# Patient Record
Sex: FEMALE | Race: WHITE
[De-identification: names, ages, dates, MRNs, and addresses within clinical notes are randomized per-mention and may not be internally consistent; named-entity substitution may affect disease eponyms.]

---

## 2017-01-12 ENCOUNTER — RECORDS - HEALTHEAST (OUTPATIENT)
Dept: ADMINISTRATIVE | Facility: OTHER | Age: 22
End: 2017-01-12

## 2017-01-22 ENCOUNTER — RECORDS - HEALTHEAST (OUTPATIENT)
Dept: ADMINISTRATIVE | Facility: OTHER | Age: 22
End: 2017-01-22

## 2017-02-09 ENCOUNTER — RECORDS - HEALTHEAST (OUTPATIENT)
Dept: ADMINISTRATIVE | Facility: OTHER | Age: 22
End: 2017-02-09

## 2017-03-23 ENCOUNTER — RECORDS - HEALTHEAST (OUTPATIENT)
Dept: ADMINISTRATIVE | Facility: OTHER | Age: 22
End: 2017-03-23

## 2017-04-16 ENCOUNTER — RECORDS - HEALTHEAST (OUTPATIENT)
Dept: ADMINISTRATIVE | Facility: OTHER | Age: 22
End: 2017-04-16

## 2017-04-17 ENCOUNTER — RECORDS - HEALTHEAST (OUTPATIENT)
Dept: ADMINISTRATIVE | Facility: OTHER | Age: 22
End: 2017-04-17

## 2017-05-04 ENCOUNTER — RECORDS - HEALTHEAST (OUTPATIENT)
Dept: ADMINISTRATIVE | Facility: OTHER | Age: 22
End: 2017-05-04

## 2017-05-04 ENCOUNTER — TRANSFERRED RECORDS (OUTPATIENT)
Dept: HEALTH INFORMATION MANAGEMENT | Facility: CLINIC | Age: 22
End: 2017-05-04

## 2017-05-09 ENCOUNTER — PRE VISIT (OUTPATIENT)
Dept: OTOLARYNGOLOGY | Facility: CLINIC | Age: 22
End: 2017-05-09

## 2017-05-09 NOTE — TELEPHONE ENCOUNTER
5/9/17 received records from [facility].    **Records team please ask patient where CT scan was done.

## 2017-05-25 ENCOUNTER — RECORDS - HEALTHEAST (OUTPATIENT)
Dept: ADMINISTRATIVE | Facility: OTHER | Age: 22
End: 2017-05-25

## 2017-06-15 NOTE — TELEPHONE ENCOUNTER
Left vmsg for pt to return call asap.    Where was imaging done/when?    May need email to send ODESSA.

## 2017-06-15 NOTE — TELEPHONE ENCOUNTER
Called [facility] and spoke to Amber, she'll get another CT Imaging disc out to me at [address] asap.

## 2017-06-21 ENCOUNTER — OFFICE VISIT (OUTPATIENT)
Dept: OTOLARYNGOLOGY | Facility: CLINIC | Age: 22
End: 2017-06-21

## 2017-06-21 VITALS — BODY MASS INDEX: 27.78 KG/M2 | WEIGHT: 177 LBS | HEIGHT: 67 IN

## 2017-06-21 DIAGNOSIS — J32.4 CHRONIC PANSINUSITIS: ICD-10-CM

## 2017-06-21 DIAGNOSIS — J32.4 CHRONIC PANSINUSITIS: Primary | ICD-10-CM

## 2017-06-21 LAB
BASOPHILS # BLD AUTO: 0 10E9/L (ref 0–0.2)
BASOPHILS NFR BLD AUTO: 0.4 %
CRP SERPL-MCNC: 3 MG/L (ref 0–8)
DIFFERENTIAL METHOD BLD: ABNORMAL
EOSINOPHIL # BLD AUTO: 0.4 10E9/L (ref 0–0.7)
EOSINOPHIL NFR BLD AUTO: 3.9 %
ERYTHROCYTE [DISTWIDTH] IN BLOOD BY AUTOMATED COUNT: 13 % (ref 10–15)
HCT VFR BLD AUTO: 47.9 % (ref 35–47)
HGB BLD-MCNC: 15.3 G/DL (ref 11.7–15.7)
IMM GRANULOCYTES # BLD: 0 10E9/L (ref 0–0.4)
IMM GRANULOCYTES NFR BLD: 0.3 %
LYMPHOCYTES # BLD AUTO: 2.5 10E9/L (ref 0.8–5.3)
LYMPHOCYTES NFR BLD AUTO: 25.7 %
MCH RBC QN AUTO: 28.3 PG (ref 26.5–33)
MCHC RBC AUTO-ENTMCNC: 31.9 G/DL (ref 31.5–36.5)
MCV RBC AUTO: 89 FL (ref 78–100)
MONOCYTES # BLD AUTO: 0.8 10E9/L (ref 0–1.3)
MONOCYTES NFR BLD AUTO: 7.6 %
NEUTROPHILS # BLD AUTO: 6.1 10E9/L (ref 1.6–8.3)
NEUTROPHILS NFR BLD AUTO: 62.1 %
NRBC # BLD AUTO: 0 10*3/UL
NRBC BLD AUTO-RTO: 0 /100
PLATELET # BLD AUTO: 253 10E9/L (ref 150–450)
RBC # BLD AUTO: 5.41 10E12/L (ref 3.8–5.2)
WBC # BLD AUTO: 9.9 10E9/L (ref 4–11)

## 2017-06-21 RX ORDER — BUDESONIDE 0.5 MG/2ML
2 INHALANT ORAL
COMMUNITY
Start: 2016-09-06

## 2017-06-21 RX ORDER — CETIRIZINE HYDROCHLORIDE 10 MG/1
10 TABLET ORAL
COMMUNITY

## 2017-06-21 RX ORDER — MEDROXYPROGESTERONE ACETATE 150 MG/ML
150 INJECTION, SUSPENSION INTRAMUSCULAR
COMMUNITY

## 2017-06-21 ASSESSMENT — PAIN SCALES - GENERAL: PAINLEVEL: NO PAIN (0)

## 2017-06-21 NOTE — PATIENT INSTRUCTIONS
Plan of care:  To schedule a sweat chloride test call [phone]  Blood work today    Clinic contact information:  1. To schedule an appointment call [phone], option 1  2. To talk to the Triage RN call [phone], option 3  3. If you need to speak to Laly or get a message to your doctor on a Friday, call the triage RN  4. LalyRN: [phone]  5. Surgery scheduling:      Cynthia Paulson: [phone]      Deisi Madrigal: [phone]  6. Fax: [phone]  7. [phone]

## 2017-06-21 NOTE — PROGRESS NOTES
Otolaryngology Adult Consultation    Patient: Hannah Otto   : 1995     Patient presents with:  Consult:   Chief Complaint   Patient presents with     Consult     chronic sinusitis        Referring Provider: Alvin Fisher   Consulting Physician:  Caroline Ayon MD       Assessment/Plan: ASSESSMENT AND PLAN:  Hannah will undergo a workup for recalcitrant chronic sinusitis.  I am going to order sweat chloride testing, cANCA, repeat her immunoglobulin testing, and have her continue with her allergy management with her baby shampoo, nasal steroid, and antihistamines.  We may bring her back and consider reimaging at some point and further addressing that maxillary antral issue with a curved microdebrider.  We also may consider looking at other possible etiologies of her facial pain.  In any event, I will let her know what her laboratory workup shows, and we will discuss results when she returns.          HPI: Hannah Otto is a 21 year old female seen today in the Otolaryngology Clinic for recalcitrant chronic rhinosinusitis.  She has had four surgeries beginning in May of 2015 through 2016.  The first was to straighten her nasal septum.  Mostly, the surgeries have been to remove cysts in her maxillary antrum.  She has had facial pain associated with this, pressure, and congestion.  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  She describes that she would have a plugged nose, anterior posterior nasal discharge, headaches, and facial pain.  She has never had problems with her ears or lungs.  She was tested for allergies.  She has seasonal allergies, and she is treated with Zyrtec.  If she does not take Zyrtec, she gets an itchy throat.  In the perioperative timeframe, she has had sinus infections treated with doxycycline and prednisone.  She has tried budesonide irrigations.  She uses baby shampoo irrigations.  She does not really feel like  anything completely helps.  When she is on antibiotics, she feels slightly better but as soon as she is off she feels worse.  She otherwise is quite healthy.  She has had an immune workup with [facility] and Dr. Gramajo.  She had low pneumococcal titers.  She was given the pneumococcal vaccine.  I do not see that they retested her titers after that.  Some of her immunoglobulin levels are on the low side but still within the normal range.  She has no family history of significant sinus disease except in her grandmother.           No current outpatient prescriptions on file prior to visit.  No current facility-administered medications on file prior to visit.        Allergies   Allergen Reactions     Sulfa Drugs        Past Medical History:   Diagnosis Date     Allergic rhinitis march 2013     Chronic sinusitis november 2014     Gastroesophageal reflux disease august 2016     Migraines november 2014     Nasal polyps may 2015         Review of Systems   ENT ROS 6/21/2017   Neurology Headache   Ears, Nose, Throat Nasal congestion or drainage   Cardiopulmonary Wheezing   Allergy/Immunology Allergies or hay fever        14 point ROS neg other than the symptoms noted above.    Physical Exam:    General Assessment   The patient is alert, oriented and in no acute distress.   Head/Face/Scalp  Grossly normal.   Ears  Normal canals, auricles and tympanic membranes.   Nose  External nose is straight, skin is normal. Intranasal exam (anterior rhinoscopy) reveals normal moist mucosa, turbinate tissue without edema, erythema or crusting.  Septum mainly in the midline.    Mouth  Oral cavity shows healthy mucosa with out ulceration, masses or other lesions involving the tongue, palate, buccal mucosa, floor of mouth or gingiva.  Dentition in good repair.  Oropharynx with normal tonsils, posterior wall and base of tongue.  Neck  No significant adenopathy, thyroid or salivary gland abnormality.       Procedure:  Nasal endoscopy  performed to examine the posterior nasal passages for pathology.    Verbal consent obtained. Topical anesthetic/decongestant solution applied per patient request.   A rigid endoscope was passed into each nasal cavity separately.  Findings are as follows:   Widely patent antrostomy and ethmoid labyrinth bilaterally.  No purulence or polyps.

## 2017-06-21 NOTE — MR AVS SNAPSHOT
After Visit Summary   2017    Hannah Otto    MRN: [id number]           Patient Information     Date Of Birth          1995        Visit Information        Provider Department      2017 3:00 PM Caroline Ayon MD [facility]        Today's Diagnoses     Chronic pansinusitis    -  1      Care Instructions    Plan of care:  To schedule a sweat chloride test call [phone]  Blood work today    Clinic contact information:  1. To schedule an appointment call [phone], option 1  2. To talk to the Triage RN call [phone], option 3  3. If you need to speak to Laly or get a message to your doctor on a Friday, call the triage RN  4. LalyRN: [phone]  5. Surgery scheduling:      Cynthia Paulson: [phone]      Deisi Garsiater: [phone]  6. Fax: [phone]  7. [phone]            Follow-ups after your visit        Future tests that were ordered for you today     Open Future Orders        Priority Expected Expires Ordered    Sweat chloride analysis Routine  2018    Antineutrophil cytoplasmic Meghan IgG Routine  2018    Strep pneumo IgG Abys 23 Serotypes Routine  2018            Who to contact     Please call your clinic at [phone] to:    Ask questions about your health    Make or cancel appointments    Discuss your medicines    Learn about your test results    Speak to your doctor   If you have compliments or concerns about an experience at your clinic, or if you wish to file a complaint, please contact [facility] Patient Relations at [phone] or email us at [email or web address]         Additional Information About Your Visit        MyChart Information     [employer] is an electronic gateway that provides easy, online access to your medical records. With [employer], you can request a clinic appointment, read your test results, renew a prescription or communicate with  "your care team.     To sign up for [employer] visit the website at [email or web address]   You will be asked to enter the access code listed below, as well as some personal information. Please follow the directions to create your username and password.     Your access code is: [email or web address]  Expires: 2017  6:31 AM     Your access code will  in 90 days. If you need help or a new code, please contact your [facility] or call [phone] for assistance.        Care EveryWhere ID     This is your Care EveryWhere ID. This could be used by other organizations to access your [place] medical records  [id number]        Your Vitals Were     Height BMI (Body Mass Index)                1.71 m (5' 7.32\") 27.46 kg/m2           Blood Pressure from Last 3 Encounters:   No data found for BP    Weight from Last 3 Encounters:   17 80.3 kg (177 lb)              We Performed the Following     CBC with platelets differential     CRP inflammation     IgA     IgE     IgM     Immunoglobulin G subclasses        Primary Care Provider    None Specified       No primary provider on file.        Equal Access to Services     [facility]: Hadii rodrigo Herman, wamillyda lujacquelineadaha, qaybta kaalmabarbara machado, bia bauman . So [facility] [phone].    ATENCIÓN: Si habla español, tiene a chino disposición servicios gratuitos de asistencia lingüística. Robbie al [phone].    We comply with applicable federal civil rights laws and [place] laws. We do not discriminate on the basis of race, color, national origin, age, disability sex, sexual orientation or gender identity.            Thank you!     Thank you for choosing [facility]  for your care. Our goal is always to provide you with excellent care. Hearing back from our patients is one way we can continue to improve our services. Please take a few minutes to complete the written survey that you may " receive in the mail after your visit with us. Thank you!             Your Updated Medication List - Protect others around you: Learn how to safely use, store and throw away your medicines at www.disposemymeds.org.          This list is accurate as of: 6/21/17  3:54 PM.  Always use your most recent med list.                   Brand Name Dispense Instructions for use Diagnosis    budesonide 0.5 MG/2ML neb solution    PULMICORT     2 mLs        cetirizine 10 MG tablet    zyrTEC     Take 10 mg by mouth        medroxyPROGESTERone 150 MG/ML injection    DEPO-PROVERA     Inject 150 mg into the muscle

## 2017-06-21 NOTE — LETTER
2017       RE: Hannah Otto  [address] [address]     Dear Colleague,    Thank you for referring your patient, Hannah Otto, to the [facility] at [facility]. Please see a copy of my visit note below.    Otolaryngology Adult Consultation    Patient: Hannah Otto   : 1995     Patient presents with:  Consult:   Chief Complaint   Patient presents with     Consult     chronic sinusitis        Referring Provider: Alvin Fisher   Consulting Physician:  Caroline Ayon MD       Assessment/Plan: ASSESSMENT AND PLAN:  Hannah will undergo a workup for recalcitrant chronic sinusitis.  I am going to order sweat chloride testing, cANCA, repeat her immunoglobulin testing, and have her continue with her allergy management with her baby shampoo, nasal steroid, and antihistamines.  We may bring her back and consider reimaging at some point and further addressing that maxillary antral issue with a curved microdebrider.  We also may consider looking at other possible etiologies of her facial pain.  In any event, I will let her know what her laboratory workup shows, and we will discuss results when she returns.          HPI: Hannah Otto is a 21 year old female seen today in the Otolaryngology Clinic for recalcitrant chronic rhinosinusitis.  She has had four surgeries beginning in May of 2015 through 2016.  The first was to straighten her nasal septum.  Mostly, the surgeries have been to remove cysts in her maxillary antrum.  She has had facial pain associated with this, pressure, and congestion.  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  She describes that she would have a plugged nose, anterior posterior nasal discharge, headaches, and facial pain.  She has never had problems with her ears or lungs.  She was tested for allergies.  She has seasonal allergies, and she is  treated with Zyrtec.  If she does not take Zyrtec, she gets an itchy throat.  In the perioperative timeframe, she has had sinus infections treated with doxycycline and prednisone.  She has tried budesonide irrigations.  She uses baby shampoo irrigations.  She does not really feel like anything completely helps.  When she is on antibiotics, she feels slightly better but as soon as she is off she feels worse.  She otherwise is quite healthy.  She has had an immune workup with [facility] and Dr. Gramajo.  She had low pneumococcal titers.  She was given the pneumococcal vaccine.  I do not see that they retested her titers after that.  Some of her immunoglobulin levels are on the low side but still within the normal range.  She has no family history of significant sinus disease except in her grandmother.           No current outpatient prescriptions on file prior to visit.  No current facility-administered medications on file prior to visit.        Allergies   Allergen Reactions     Sulfa Drugs        Past Medical History:   Diagnosis Date     Allergic rhinitis march 2013     Chronic sinusitis november 2014     Gastroesophageal reflux disease august 2016     Migraines november 2014     Nasal polyps may 2015         Review of Systems   ENT ROS 6/21/2017   Neurology Headache   Ears, Nose, Throat Nasal congestion or drainage   Cardiopulmonary Wheezing   Allergy/Immunology Allergies or hay fever        14 point ROS neg other than the symptoms noted above.    Physical Exam:    General Assessment   The patient is alert, oriented and in no acute distress.   Head/Face/Scalp  Grossly normal.   Ears  Normal canals, auricles and tympanic membranes.   Nose  External nose is straight, skin is normal. Intranasal exam (anterior rhinoscopy) reveals normal moist mucosa, turbinate tissue without edema, erythema or crusting.  Septum mainly in the midline.    Mouth  Oral cavity shows healthy mucosa with out ulceration, masses or  other lesions involving the tongue, palate, buccal mucosa, floor of mouth or gingiva.  Dentition in good repair.  Oropharynx with normal tonsils, posterior wall and base of tongue.  Neck  No significant adenopathy, thyroid or salivary gland abnormality.       Procedure:  Nasal endoscopy performed to examine the posterior nasal passages for pathology.    Verbal consent obtained. Topical anesthetic/decongestant solution applied per patient request.   A rigid endoscope was passed into each nasal cavity separately.  Findings are as follows:   Widely patent antrostomy and ethmoid labyrinth bilaterally.  No purulence or polyps.        Again, thank you for allowing me to participate in the care of your patient.      Sincerely,    Caroline Ayon MD

## 2017-06-21 NOTE — NURSING NOTE
Chief Complaint   Patient presents with     Consult     chronic sinusitis   Nathen Marshall LPN

## 2017-06-22 LAB
IGA SERPL-MCNC: 68 MG/DL (ref 70–380)
IGM SERPL-MCNC: 175 MG/DL (ref 60–265)

## 2017-06-23 LAB
ANCA IGG TITR SER IF: NORMAL {TITER}
DEPRECATED S PNEUM 1 IGG SER-MCNC: 20.3 UG/ML
DEPRECATED S PNEUM12 IGG SER-MCNC: 0.1 UG/ML
DEPRECATED S PNEUM14 IGG SER-MCNC: 3.6 UG/ML
DEPRECATED S PNEUM17 IGG SER-MCNC: 5.1 UG/ML
DEPRECATED S PNEUM19 IGG SER-MCNC: 17.3 UG/ML
DEPRECATED S PNEUM2 IGG SER-MCNC: 8.4 UG/ML
DEPRECATED S PNEUM20 IGG SER-MCNC: 0.6 UG/ML
DEPRECATED S PNEUM22 IGG SER-MCNC: 15.3 UG/ML
DEPRECATED S PNEUM23 IGG SER-MCNC: 16.7 UG/ML
DEPRECATED S PNEUM3 IGG SER-MCNC: 3.8 UG/ML
DEPRECATED S PNEUM34 IGG SER-MCNC: 2.2 UG/ML
DEPRECATED S PNEUM4 IGG SER-MCNC: 17.5 UG/ML
DEPRECATED S PNEUM43 IGG SER-MCNC: 6.7 UG/ML
DEPRECATED S PNEUM5 IGG SER-MCNC: NORMAL UG/ML
DEPRECATED S PNEUM8 IGG SER-MCNC: 2.4 UG/ML
DEPRECATED S PNEUM9 IGG SER-MCNC: 12.5 UG/ML
IGG SERPL-MCNC: 841 MG/DL (ref 695–1620)
IGG1 SER-MCNC: 463 MG/DL (ref 300–856)
IGG2 SER-MCNC: 201 MG/DL (ref 158–761)
IGG3 SER-MCNC: 91 MG/DL (ref 24–192)
IGG4 SER-MCNC: 9 MG/DL (ref 11–86)
S PNEUM DA 15B IGG SER-MCNC: 2.5 UG/ML
S PNEUM DA 18C IGG SER-MCNC: 17.6
S PNEUM DA 19A IGG SER-MCNC: 2.2 UG/ML
S PNEUM DA 33F IGG SER-MCNC: 6 UG/ML
S PNEUM DA 6B IGG SER-MCNC: 12.3 UG/ML
S PNEUM DA 7F IGG SER-MCNC: 5 UG/ML
S PNEUM DA 9V IGG SER-MCNC: 10.5 UG/ML

## 2017-06-25 LAB — IGE SERPL-ACNC: 10 KIU/L (ref 0–114)

## 2017-06-27 ENCOUNTER — RECORDS - HEALTHEAST (OUTPATIENT)
Dept: ADMINISTRATIVE | Facility: OTHER | Age: 22
End: 2017-06-27

## 2017-06-27 DIAGNOSIS — J32.4 CHRONIC PANSINUSITIS: ICD-10-CM

## 2017-06-27 LAB — SWEAT CHLORIDE: NORMAL MMOL/L CL (ref 0–30)

## 2017-06-27 PROCEDURE — 89230 COLLECT SWEAT FOR TEST: CPT | Performed by: OTOLARYNGOLOGY

## 2017-06-28 NOTE — PROGRESS NOTES
Laly, Please contact patient by letter/phone with following results: My chart note sent, please remind me to contact Dr. Colunga.

## 2017-07-10 DIAGNOSIS — R51.9 FACIAL PAIN: Primary | ICD-10-CM

## 2017-07-13 ENCOUNTER — OFFICE VISIT (OUTPATIENT)
Dept: NEUROLOGY | Facility: CLINIC | Age: 22
End: 2017-07-13

## 2017-07-13 ENCOUNTER — RECORDS - HEALTHEAST (OUTPATIENT)
Dept: ADMINISTRATIVE | Facility: OTHER | Age: 22
End: 2017-07-13

## 2017-07-13 VITALS — DIASTOLIC BLOOD PRESSURE: 106 MMHG | RESPIRATION RATE: 18 BRPM | HEART RATE: 74 BPM | SYSTOLIC BLOOD PRESSURE: 147 MMHG

## 2017-07-13 DIAGNOSIS — H57.12 RETRO-ORBITAL PAIN OF LEFT EYE: ICD-10-CM

## 2017-07-13 DIAGNOSIS — G50.1 ATYPICAL FACE PAIN: Primary | ICD-10-CM

## 2017-07-13 RX ORDER — NORTRIPTYLINE HCL 10 MG
CAPSULE ORAL
Qty: 90 CAPSULE | Refills: 1 | Status: SHIPPED | OUTPATIENT
Start: 2017-07-13

## 2017-07-13 ASSESSMENT — PAIN SCALES - GENERAL: PAINLEVEL: MODERATE PAIN (5)

## 2017-07-13 NOTE — LETTER
"7/13/2017       RE: Hannah Otto  [address] [address]     Dear Colleague,    Thank you for referring your patient, Hannah Otto, to the [facility] NEUROLOG[facility]IC at [facility]. Please see a copy of my visit note below.      DATE OF SERVICE::  07/13/2017.    Dear Dr. Ayon:      Thank you for referring Hannah Otto for neurologic consultation on 07/13/2017.  The patient is a 21-year-old woman you were kind enough to refer with a chief complaint of left facial discomfort.    The patient has had pain in the left facial area over the last 4 years.  She said it varies but involves the left maxillary area and does at times feel to be behind the left eye.  She also notes that sometimes it will have more periorbital distribution.  She said that the pain can last anywhere from 2 hours up to a whole week.  She has never noticed a ptosis with it.  There is no change in her sclerae.  She has not had any conjunctival symptoms.  She did indicate that her mother can tell when she has pain there because her face is \"puffy there.\"  She has not had any rhinitis with it that is unilateral.  Sometimes she has had this in both nostrils.  She has not had pain that radiates in the ear to the forehead or to the lower face or mandible.  I went over the inciting factors for migraine with her and really nothing that she could think of could bring it on.  She does have some intolerance of bright lights.  She is not certain alcohol would help.  The patient does not notice really that alcohol brings on the pain.  She did note that the pain can be both throbbing and constant.  She really does not notice trouble with mastication but she earlier had noticed a click in her jaw.  The patient did feel that using a cold compress would help the discomfort.  She has not had visual disturbance including obscuration of vision or diplopia.  There is nothing to " suggest focal or generalized weakness and no facial or limb paresthesias and no imbalance.  She has no obvious headache with this and has not suffered from migraine with symptoms involving her scalp.  She drinks up to 36 ounces of caffeinated beverage per day.  She sometimes will also drink tea.  She does not drink corazon, typically she drinks coffee.  She eats some foods with nitrates and she does eat chocolate about once per week.  She also eats a lot of food with tyramine.  She did note that her maternal aunt has had what she describes as cluster migraine and has had visual aura evidently.  The patient has tried Pulmicort and a Elisabeth pot, and does take Zyrtec daily for her sinus issues.  She has been using ibuprofen which does help up to 200 mg 3 times per week.  She did note that on 12/22/2016 she fell on ice and struck the back of her head.  She did not notice any residual symptoms including pain behind the eye then.  The patient never had loss of consciousness.  She had a normal and negative CT scan of the head on 12/22/2016.  Her hemoglobin was 14.2 on 04/18/2016 and she had normal chemistry profile on 12/10/2011.  She has a stable relationship with her boyfriend.  She denies any unusual stress.  She is on Depo for birth control.    ALLERGIES:  She has allergies listed to sulfa.    SOCIAL HISTORY:  The patient works as a .  She has had a total of 4 different surgeries involving her sinuses and she probably will have another surgery with you.  The patient never has used any illicit drugs.  She does not smoke and uses very little alcohol.    The patient has had recalcitrant chronic rhino sinusitis.  I do note from your records that Dr. Fisher did first straighten her nasal septum.  Mostly the surgeries have been to remove cysts in her maxillary antrum.  You noted that she had facial pain associated with this pressure and congestion.  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    NEUROLOGIC EXAMINATION:  Revealed a pleasant young woman.  Her blood pressure initially was 147/106 with a pulse 74, with machine blood pressure cuff done over the soft large cuff was 128/80 with a pulse of 74.  Gait, station, cerebellar testing, muscle stretch reflexes, plantar stimulation, strength, careful cranial nerve examination, superficial cortical sensory testing are all unremarkable.  I could not auscultate cervical or cerebral bruits and she has a regular cardiac rhythm.  The patient has a supple neck.    IMPRESSION:  Atypical facial pain versus common migraine:  The patient has somewhat unusual facial pain but could have common migraine involving the face only.  Sunlight does seem to bring on the headache or is an inciting factor.  She does have an aunt who has had migraine.  I have talked with her about trying medication and did specifically review with her different medicines that could be tried.  I did recommend nortriptyline 10 mg to be increased to 30 mg.  I did go over the potential side effects including daytime sedation and trouble driving, cardiovascular problems, dry mouth and caries as well as weight gain.  I have asked that she have formal radiologic studies to help rule out intracranial aneurysm causing pain behind the eye.  I think she should also consider having formal neuro ophthalmological examination because of her pain in the retro orbital area.  I have asked that she have follow up with me.  A complete review of medical systems was done and the positive review is listed in the report above.    I did review with the patient foods and beverages that could make headaches worse and did strongly urge that she decrease her use of caffeinated coffee to less than 6 ounces per day.    I spent 1 hour with the patient today, over 50% of the time for this involved counseling and coordination of care.    Thank you for allowing me to see this patient.    Sincerely yours, Pa  Srinivasa Bernabe MD    D:  07/20/2017 19:04 T:  07/23/2017 18:22  Document:  [id number] ND\LG    cc: Dr. Caroline Ayon  [facility]    Dr. Batres  \

## 2017-07-13 NOTE — MR AVS SNAPSHOT
After Visit Summary   7/13/2017    Hannah Otto    MRN: [id number]           Patient Information     Date Of Birth          1995        Visit Information        Provider Department      7/13/2017 12:00 PM Pa Bernabe MD [facility]        Today's Diagnoses     Atypical face pain    -  1    Retro-orbital pain of left eye           Follow-ups after your visit        Follow-up notes from your care team     Return in about 4 weeks (around 8/10/2017).      Who to contact     Please call your clinic at [phone] to:    Ask questions about your health    Make or cancel appointments    Discuss your medicines    Learn about your test results    Speak to your doctor   If you have compliments or concerns about an experience at your clinic, or if you wish to file a complaint, please contact [facility] Patient Relations at [phone] or email us at [email or web address]         Additional Information About Your Visit        MyChart Information     [employer] gives you secure access to your electronic health record. If you see a primary care provider, you can also send messages to your care team and make appointments. If you have questions, please call your primary care clinic.  If you do not have a primary care provider, please call [phone] and they will assist you.      [employer] is an electronic gateway that provides easy, online access to your medical records. With [employer], you can request a clinic appointment, read your test results, renew a prescription or communicate with your care team.     To access your existing account, please contact your [facility] or call [phone] for assistance.        Care EveryWhere ID     This is your Care EveryWhere ID. This could be used by other organizations to access your [facility] medical records  [id number]        Your Vitals Were      Pulse Respirations                74 18           Blood Pressure from Last 3 Encounters:   07/13/17 (!) 147/106    Weight from Last 3 Encounters:   06/21/17 177 lb (80.3 kg)                 Today's Medication Changes          These changes are accurate as of: 7/13/17 11:59 PM.  If you have any questions, ask your nurse or doctor.               Start taking these medicines.        Dose/Directions    nortriptyline 10 MG capsule   Commonly known as:  PAMELOR   Used for:  Atypical face pain, Retro-orbital pain of left eye   Started by:  Pa Bernabe MD        Start at 1 tab at bedtime for 3 days, then 2 tabs at bedtime for 3 days, then 3 tabs at bedtime.   Quantity:  90 capsule   Refills:  1            Where to get your medicines      These medications were sent to [place] [id number] [place] [facility] - [facility], [address], [facility] [address]     Phone:  [phone]     nortriptyline 10 MG capsule                Primary Care Provider Office Phone # Fax #    Gisel Idalia Miguel [phone]       [address] [address]        Equal Access to Services     [facility]: Hadii rodrigo lamar hadjeanetteo Soshay, waaxda luqadaha, qaybta kaalmada adeflorinayada, bia bauman . So [facility] [phone].    ATENCIÓN: Si habla español, tiene a chino disposición servicios gratuitos de asistencia lingüística. Llame al [phone].    We comply with applicable federal civil rights laws and [place] laws. We do not discriminate on the basis of race, color, national origin, age, disability sex, sexual orientation or gender identity.            Thank you!     Thank you for choosing [facility]  for your care. Our goal is always to provide you with excellent care. Hearing back from our patients is one way we can continue to improve our services. Please take a few minutes to complete the  written survey that you may receive in the mail after your visit with us. Thank you!             Your Updated Medication List - Protect others around you: Learn how to safely use, store and throw away your medicines at www.disposemymeds.org.          This list is accurate as of: 7/13/17 11:59 PM.  Always use your most recent med list.                   Brand Name Dispense Instructions for use Diagnosis    budesonide 0.5 MG/2ML neb solution    PULMICORT     2 mLs        cetirizine 10 MG tablet    zyrTEC     Take 10 mg by mouth        medroxyPROGESTERone 150 MG/ML injection    DEPO-PROVERA     Inject 150 mg into the muscle        nortriptyline 10 MG capsule    PAMELOR    90 capsule    Start at 1 tab at bedtime for 3 days, then 2 tabs at bedtime for 3 days, then 3 tabs at bedtime.    Atypical face pain, Retro-orbital pain of left eye

## 2017-07-25 NOTE — PROGRESS NOTES
"  DATE OF SERVICE::  07/13/2017.    Dear Dr. Ayon:      Thank you for referring Hannah Otto for neurologic consultation on 07/13/2017.  The patient is a 21-year-old woman you were kind enough to refer with a chief complaint of left facial discomfort.    The patient has had pain in the left facial area over the last 4 years.  She said it varies but involves the left maxillary area and does at times feel to be behind the left eye.  She also notes that sometimes it will have more periorbital distribution.  She said that the pain can last anywhere from 2 hours up to a whole week.  She has never noticed a ptosis with it.  There is no change in her sclerae.  She has not had any conjunctival symptoms.  She did indicate that her mother can tell when she has pain there because her face is \"puffy there.\"  She has not had any rhinitis with it that is unilateral.  Sometimes she has had this in both nostrils.  She has not had pain that radiates in the ear to the forehead or to the lower face or mandible.  I went over the inciting factors for migraine with her and really nothing that she could think of could bring it on.  She does have some intolerance of bright lights.  She is not certain alcohol would help.  The patient does not notice really that alcohol brings on the pain.  She did note that the pain can be both throbbing and constant.  She really does not notice trouble with mastication but she earlier had noticed a click in her jaw.  The patient did feel that using a cold compress would help the discomfort.  She has not had visual disturbance including obscuration of vision or diplopia.  There is nothing to suggest focal or generalized weakness and no facial or limb paresthesias and no imbalance.  She has no obvious headache with this and has not suffered from migraine with symptoms involving her scalp.  She drinks up to 36 ounces of caffeinated beverage per day.  She sometimes will also drink tea.  She does not drink " corazon, typically she drinks coffee.  She eats some foods with nitrates and she does eat chocolate about once per week.  She also eats a lot of food with tyramine.  She did note that her maternal aunt has had what she describes as cluster migraine and has had visual aura evidently.  The patient has tried Pulmicort and a [place] pot, and does take Zyrtec daily for her sinus issues.  She has been using ibuprofen which does help up to 200 mg 3 times per week.  She did note that on 12/22/2016 she fell on ice and struck the back of her head.  She did not notice any residual symptoms including pain behind the eye then.  The patient never had loss of consciousness.  She had a normal and negative CT scan of the head on 12/22/2016.  Her hemoglobin was 14.2 on 04/18/2016 and she had normal chemistry profile on 12/10/2011.  She has a stable relationship with her boyfriend.  She denies any unusual stress.  She is on Depo for birth control.    ALLERGIES:  She has allergies listed to sulfa.    SOCIAL HISTORY:  The patient works as a .  She has had a total of 4 different surgeries involving her sinuses and she probably will have another surgery with you.  The patient never has used any illicit drugs.  She does not smoke and uses very little alcohol.    The patient has had recalcitrant chronic rhino sinusitis.  I do note from your records that Dr. Fisher did first straighten her nasal septum.  Mostly the surgeries have been to remove cysts in her maxillary antrum.  You noted that she had facial pain associated with this pressure and congestion.  She was in fab high in 2013 and began to develop the sinus infections.    NEUROLOGIC EXAMINATION:  Revealed a pleasant young woman.  Her blood pressure initially was 147/106 with a pulse 74, with machine blood pressure cuff done over the soft large cuff was 128/80 with a pulse of 74.  Gait, station, cerebellar testing, muscle stretch reflexes, plantar stimulation, strength, careful  cranial nerve examination, superficial cortical sensory testing are all unremarkable.  I could not auscultate cervical or cerebral bruits and she has a regular cardiac rhythm.  The patient has a supple neck.    IMPRESSION:  Atypical facial pain versus common migraine:  The patient has somewhat unusual facial pain but could have common migraine involving the face only.  Sunlight does seem to bring on the headache or is an inciting factor.  She does have an aunt who has had migraine.  I have talked with her about trying medication and did specifically review with her different medicines that could be tried.  I did recommend nortriptyline 10 mg to be increased to 30 mg.  I did go over the potential side effects including daytime sedation and trouble driving, cardiovascular problems, dry mouth and caries as well as weight gain.  I have asked that she have formal radiologic studies to help rule out intracranial aneurysm causing pain behind the eye.  I think she should also consider having formal neuro ophthalmological examination because of her pain in the retro orbital area.  I have asked that she have follow up with me.  A complete review of medical systems was done and the positive review is listed in the report above.    I did review with the patient foods and beverages that could make headaches worse and did strongly urge that she decrease her use of caffeinated coffee to less than 6 ounces per day.    I spent 1 hour with the patient today, over 50% of the time for this involved counseling and coordination of care.    Thank you for allowing me to see this patient.    Sincerely yours, Pa Bernabe MD    D:  07/20/2017 19:04 T:  07/23/2017 18:22  Document:  [id number] ND\LG    cc: Dr. Caroline Ayon  [facility]    Dr. Batres

## 2017-11-12 ENCOUNTER — HEALTH MAINTENANCE LETTER (OUTPATIENT)
Age: 22
End: 2017-11-12

## 2017-12-10 ENCOUNTER — RECORDS - HEALTHEAST (OUTPATIENT)
Dept: ADMINISTRATIVE | Facility: OTHER | Age: 22
End: 2017-12-10

## 2018-10-05 ENCOUNTER — RECORDS - HEALTHEAST (OUTPATIENT)
Dept: ADMINISTRATIVE | Facility: OTHER | Age: 23
End: 2018-10-05

## 2020-03-02 ENCOUNTER — HEALTH MAINTENANCE LETTER (OUTPATIENT)
Age: 25
End: 2020-03-02

## 2020-12-14 ENCOUNTER — HEALTH MAINTENANCE LETTER (OUTPATIENT)
Age: 25
End: 2020-12-14

## 2021-04-18 ENCOUNTER — HEALTH MAINTENANCE LETTER (OUTPATIENT)
Age: 26
End: 2021-04-18

## 2021-06-01 ENCOUNTER — RECORDS - HEALTHEAST (OUTPATIENT)
Dept: ADMINISTRATIVE | Facility: CLINIC | Age: 26
End: 2021-06-01

## 2021-06-03 ENCOUNTER — RECORDS - HEALTHEAST (OUTPATIENT)
Dept: ADMINISTRATIVE | Facility: CLINIC | Age: 26
End: 2021-06-03

## 2021-10-02 ENCOUNTER — HEALTH MAINTENANCE LETTER (OUTPATIENT)
Age: 26
End: 2021-10-02

## 2022-05-14 ENCOUNTER — HEALTH MAINTENANCE LETTER (OUTPATIENT)
Age: 27
End: 2022-05-14

## 2022-09-03 ENCOUNTER — HEALTH MAINTENANCE LETTER (OUTPATIENT)
Age: 27
End: 2022-09-03

## 2023-06-03 ENCOUNTER — HEALTH MAINTENANCE LETTER (OUTPATIENT)
Age: 28
End: 2023-06-03